# Patient Record
Sex: FEMALE | Race: BLACK OR AFRICAN AMERICAN | NOT HISPANIC OR LATINO | URBAN - METROPOLITAN AREA
[De-identification: names, ages, dates, MRNs, and addresses within clinical notes are randomized per-mention and may not be internally consistent; named-entity substitution may affect disease eponyms.]

---

## 2021-05-27 ENCOUNTER — EMERGENCY (EMERGENCY)
Facility: HOSPITAL | Age: 36
LOS: 0 days | Discharge: HOME | End: 2021-05-27
Attending: EMERGENCY MEDICINE | Admitting: EMERGENCY MEDICINE
Payer: COMMERCIAL

## 2021-05-27 VITALS
SYSTOLIC BLOOD PRESSURE: 128 MMHG | TEMPERATURE: 97 F | RESPIRATION RATE: 16 BRPM | HEART RATE: 63 BPM | OXYGEN SATURATION: 99 % | DIASTOLIC BLOOD PRESSURE: 82 MMHG | WEIGHT: 179.9 LBS

## 2021-05-27 VITALS
OXYGEN SATURATION: 100 % | SYSTOLIC BLOOD PRESSURE: 118 MMHG | HEART RATE: 60 BPM | DIASTOLIC BLOOD PRESSURE: 74 MMHG | RESPIRATION RATE: 18 BRPM

## 2021-05-27 DIAGNOSIS — M25.511 PAIN IN RIGHT SHOULDER: ICD-10-CM
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
## 2021-05-27 DIAGNOSIS — J45.909 UNSPECIFIED ASTHMA, UNCOMPLICATED: ICD-10-CM

## 2021-05-27 DIAGNOSIS — R00.1 BRADYCARDIA, UNSPECIFIED: ICD-10-CM

## 2021-05-27 DIAGNOSIS — S43.014A ANTERIOR DISLOCATION OF RIGHT HUMERUS, INITIAL ENCOUNTER: ICD-10-CM
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
## 2021-05-27 PROCEDURE — 99284 EMERGENCY DEPT VISIT MOD MDM: CPT | Mod: 57

## 2021-05-27 PROCEDURE — 23655 CLTX SHO DSLC W/MNPJ W/ANES: CPT | Mod: 54

## 2021-05-27 PROCEDURE — 93010 ELECTROCARDIOGRAM REPORT: CPT

## 2021-05-27 PROCEDURE — 73030 X-RAY EXAM OF SHOULDER: CPT | Mod: 26,RT

## 2021-05-27 RX ORDER — PROPOFOL 10 MG/ML
40 INJECTION, EMULSION INTRAVENOUS ONCE
Refills: 0 | Status: COMPLETED | OUTPATIENT
Start: 2021-05-27 | End: 2021-05-27

## 2021-05-27 RX ORDER — SODIUM CHLORIDE 9 MG/ML
1000 INJECTION, SOLUTION INTRAVENOUS ONCE
Refills: 0 | Status: COMPLETED | OUTPATIENT
Start: 2021-05-27 | End: 2021-05-27

## 2021-05-27 RX ORDER — ALBUTEROL 90 UG/1
2 AEROSOL, METERED ORAL
Qty: 0 | Refills: 0 | DISCHARGE

## 2021-05-27 RX ORDER — KETAMINE HYDROCHLORIDE 100 MG/ML
80 INJECTION INTRAMUSCULAR; INTRAVENOUS ONCE
Refills: 0 | Status: DISCONTINUED | OUTPATIENT
Start: 2021-05-27 | End: 2021-05-27

## 2021-05-27 RX ORDER — KETAMINE HYDROCHLORIDE 100 MG/ML
40 INJECTION INTRAMUSCULAR; INTRAVENOUS ONCE
Refills: 0 | Status: DISCONTINUED | OUTPATIENT
Start: 2021-05-27 | End: 2021-05-27

## 2021-05-27 RX ADMIN — KETAMINE HYDROCHLORIDE 40 MILLIGRAM(S): 100 INJECTION INTRAMUSCULAR; INTRAVENOUS at 09:22

## 2021-05-27 RX ADMIN — SODIUM CHLORIDE 1000 MILLILITER(S): 9 INJECTION, SOLUTION INTRAVENOUS at 09:18

## 2021-05-27 RX ADMIN — PROPOFOL 40 MILLIGRAM(S): 10 INJECTION, EMULSION INTRAVENOUS at 09:22

## 2021-05-27 NOTE — ED ADULT NURSE NOTE - OBJECTIVE STATEMENT
pt states she was carrying dry wall, felt right shoulder pop out of place. pt states this has happened before, denies any other injury.

## 2021-05-27 NOTE — ED ADULT NURSE NOTE - NS ED NURSE RECORD ANOTHER HT AND WT
Yes
I Ky Hunter MD saw and examined the patient. MLP saw and examined the patient under my supervision. I discussed the care of the patient with MLP and agree with MLP's plan, assessment and care of the patient while in the ED.

## 2021-05-27 NOTE — ED PROVIDER NOTE - ATTENDING CONTRIBUTION TO CARE
I personally evaluated patient. I agree with the findings and plan with all documentation on chart except as documented  in my note.    Patient presents with a atraumatic redislocation of her right shoulder. Patient n/v intact but has an obvious R anterior shoulder dislocation. No other pain or injuries. Patient reports her shoulder only goes back in with conscious sedation. Patient was consent, had conscious sedation and shoulder reduction without issue. Shoulder immobilizer placed. Repeat neuro exam is normal.    Shoulder dislocation home care and instructions discussed.    Full DC instructions discussed and patient knows when to seek immediate medical attention.  Patient has proper follow up.  All results discussed and patient aware they may require further work up.  Proper follow up ensured. Limitations of ED work up discussed.  Medications administered and prescribed/OTC home meds discussed.  All questions and concerns from patient or family addressed. Understanding of instructions verbalized.

## 2021-05-27 NOTE — ED ADULT TRIAGE NOTE - CHIEF COMPLAINT QUOTE
Pt was lifting drywall and felt her right shoulder pop out; pt states this has happened 3x before and has needed to be sedated. Pt is supposed to go for surgery.

## 2021-05-27 NOTE — ED PROVIDER NOTE - CARE PROVIDER_API CALL
Twila Vela)  Orthopaedic Surgery  [address] [place] [id number]  Phone: [phone]  Fax: [phone]  Follow Up Time:

## 2021-05-27 NOTE — ED PROVIDER NOTE - PROGRESS NOTE DETAILS
BK: Patient feeling much better s/p reduction and reports no pain. Full sensation in anterior shoulder and arm. Full ROM of fingers and wrist. Radial pulses 2+ b/l. Cap refill <2 seconds. Patient tolerating PO and ambulating without any issues. Agreeable for discharge now with ortho follow up for surgical fixation. PRICE: pt ambulating, tolerating po. return precautions given. pt will f/u this week with ortho.

## 2021-05-27 NOTE — ED PROVIDER NOTE - CLINICAL SUMMARY MEDICAL DECISION MAKING FREE TEXT BOX
Procedural sedation discharge instructions discussed and given.    Patient presents with a atraumatic redislocation of her right shoulder. Patient n/v intact but has an obvious R anterior shoulder dislocation. No other pain or injuries. Patient reports her shoulder only goes back in with conscious sedation. Patient was consent, had conscious sedation and shoulder reduction without issue. Shoulder immobilizer placed.    Shoulder dislocation home care and instructions discussed. Will DC with Orthopedics follow up.    Full DC instructions discussed and patient knows when to seek immediate medical attention.  Patient has proper follow up.  All results discussed and patient aware they may require further work up.  Proper follow up ensured. Limitations of ED work up discussed.  Medications administered and prescribed/OTC home meds discussed.  All questions and concerns from patient or family addressed. Understanding of instructions verbalized.

## 2021-05-27 NOTE — ED PROCEDURE NOTE - NS_POSTPROCCAREGUIDE_ED_ALL_ED
Patient is now fully awake, with vital signs and temperature stable, hydration is adequate, patients Souleymane’s  score is at baseline (or greater than 8), patient and escort has received  discharge education.

## 2021-05-27 NOTE — ED PROVIDER NOTE - PHYSICAL EXAMINATION
CONSTITUTIONAL: well-appearing, in NAD  SKIN: Warm dry, normal skin turgor  HEAD: NCAT  EYES: EOMI, PERRLA, no scleral icterus, conjunctiva pink  ENT: normal pharynx with no erythema or exudates  NECK: Supple; non tender. Full ROM.  CARD: RRR, no murmurs.  RESP: clear to ausculation b/l. No crackles or wheezing.  ABD: soft, non-tender, non-distended, no rebound or guarding.  EXT: right arm held in adduction, obvious shoulder deformity; radial pulses 2+ b/l, cap refill <2, full ROM of hand and wrist; no tenderness in hand, wrist, or elbow  NEURO: normal motor. normal sensory. Normal gait.  PSYCH: Cooperative, appropriate.

## 2021-05-27 NOTE — ED PROVIDER NOTE - NSFOLLOWUPINSTRUCTIONS_ED_ALL_ED_FT
Shoulder Dislocation  Your shoulder joint is made up of 3 bones:  The upper arm bone (humerus).The shoulder blade (scapula).The collarbone (clavicle).A shoulder dislocation happens when your upper arm bone moves out of its normal place in your shoulder joint.  What are the causes?  This condition is often caused by:  A fall.A hard, direct hit to the shoulder.A forceful movement of the shoulder.What increases the risk?  You are more likely to develop this condition if you play sports.  What are the signs or symptoms?     Bad shape (deformity) of the shoulder.Very bad pain.A shoulder that you cannot move.Numbness, weakness, or tingling in your neck or down your arm.Bruising or swelling around your shoulder.How is this treated?  This condition is treated with a procedure called a reduction. This is done to place the upper arm bone back in the joint. There are two types of reduction:  Closed reduction. The upper arm bone is placed back in the joint without surgery. The doctor uses his or her hands to guide the bone back into place.Open reduction. Surgery is done to place the upper arm bone back in the joint. This may be needed if:  You have a weak shoulder joint or weak tissues that connect bones to each other (ligaments).You have had more than one shoulder dislocation.The nerves or blood vessels around your shoulder have been damaged.After the procedure, you will wear a brace or sling to prevent the arm from moving.  After the brace or sling is removed, you will have physical therapy to help improve movement (range of motion) in your shoulder joint.  Follow these instructions at home:  Medicines     Take over-the-counter and prescription medicines only as told by your doctor.Ask your doctor if the medicine prescribed to you:   Requires you to avoid driving or using heavy machinery. Can cause trouble pooping (constipation). You may need to take steps to prevent or treat trouble pooping:  Drink enough fluid to keep your pee (urine) pale yellow.Take over-the-counter or prescription medicines.Eat foods that are high in fiber. These include beans, whole grains, and fresh fruits and vegetables. Limit foods that are high in fat and sugar. These include fried or sweet foods.If you have a brace or sling:     Wear the brace or sling as told by your doctor. Remove it only as told by your doctor.Loosen the brace or sling if your fingers:  Tingle.Become numb.Turn cold or blue.Keep the brace or sling clean.If the brace or sling is not waterproof:  Do not let it get wet.Cover it with a watertight covering when you take a bath or shower.Managing pain, stiffness, and swelling        If told, put ice on the injured area.  If you can remove your brace or sling, remove it as told by your doctor.Put ice in a plastic bag.Place a towel between your skin and the bag.Leave the ice on for 20 minutes, 2–3 times per day.Move your fingers often.Raise (elevate) the injured area above the level of your heart while you are sitting or lying down.Activity     Do not lift your arm above shoulder level until your doctor approves.Do not lift anything until your doctor says that it is safe.Do not push or pull things until your doctor approves.Return to your normal activities as told by your doctor. Ask your doctor what activities are safe for you.Do range-of-motion exercises only as told by your doctor.Exercise your hand by squeezing a soft ball. This keeps your hand and wrist from getting stiff and swollen.General instructions     Do not drive while you are wearing a brace or sling on a hand that you use for driving.Do not take baths, swim, or use a hot tub until your doctor approves. Ask your doctor if you may take showers. You may only be allowed to take sponge baths.Do not use any tobacco products, including cigarettes, chewing tobacco, or e-cigarettes. These can delay healing. If you need help quitting, ask your doctor.Keep all follow-up visits as told by your doctor. This is important.Contact a doctor if:  Your brace or sling gets damaged.Get help right away if:  Your pain gets worse, not better.You lose feeling in your arm or hand.Your arm or hand turns white and cold.Summary  A shoulder dislocation happens when your upper arm bone moves out of its normal place in your shoulder joint.It is often caused by a fall, a strong hit to the shoulder, or a forceful movement of the shoulder.It causes very bad pain. You may not be able to move your shoulder.This condition is treated with either closed or open reduction. You will also be given a brace or sling. You will do exercises to improve movement in your shoulder joint.Contact a doctor if your brace or sling gets damaged. Get help right away if your pain gets worse, you lose feeling in your arm or hand, or your arm or hand turns white or cold.This information is not intended to replace advice given to you by your health care provider. Make sure you discuss any questions you have with your health care provider. Shoulder Dislocation  Your shoulder joint is made up of 3 bones:  The upper arm bone (humerus).The shoulder blade (scapula).The collarbone (clavicle).A shoulder dislocation happens when your upper arm bone moves out of its normal place in your shoulder joint.  What are the causes?  This condition is often caused by:  A fall.A hard, direct hit to the shoulder.A forceful movement of the shoulder.What increases the risk?  You are more likely to develop this condition if you play sports.  What are the signs or symptoms?     Bad shape (deformity) of the shoulder.Very bad pain.A shoulder that you cannot move.Numbness, weakness, or tingling in your neck or down your arm.Bruising or swelling around your shoulder.How is this treated?  This condition is treated with a procedure called a reduction. This is done to place the upper arm bone back in the joint. There are two types of reduction:  Closed reduction. The upper arm bone is placed back in the joint without surgery. The doctor uses his or her hands to guide the bone back into place.Open reduction. Surgery is done to place the upper arm bone back in the joint. This may be needed if:  You have a weak shoulder joint or weak tissues that connect bones to each other (ligaments).You have had more than one shoulder dislocation.The nerves or blood vessels around your shoulder have been damaged.After the procedure, you will wear a brace or sling to prevent the arm from moving.  After the brace or sling is removed, you will have physical therapy to help improve movement (range of motion) in your shoulder joint.  Follow these instructions at home:  Medicines     Take over-the-counter and prescription medicines only as told by your doctor.Ask your doctor if the medicine prescribed to you:   Requires you to avoid driving or using heavy machinery. Can cause trouble pooping (constipation). You may need to take steps to prevent or treat trouble pooping:  Drink enough fluid to keep your pee (urine) pale yellow.Take over-the-counter or prescription medicines.Eat foods that are high in fiber. These include beans, whole grains, and fresh fruits and vegetables. Limit foods that are high in fat and sugar. These include fried or sweet foods.If you have a brace or sling:     Wear the brace or sling as told by your doctor. Remove it only as told by your doctor.Loosen the brace or sling if your fingers:  Tingle.Become numb.Turn cold or blue.Keep the brace or sling clean.If the brace or sling is not waterproof:  Do not let it get wet.Cover it with a watertight covering when you take a bath or shower.Managing pain, stiffness, and swelling        If told, put ice on the injured area.  If you can remove your brace or sling, remove it as told by your doctor.Put ice in a plastic bag.Place a towel between your skin and the bag.Leave the ice on for 20 minutes, 2–3 times per day.Move your fingers often.Raise (elevate) the injured area above the level of your heart while you are sitting or lying down.Activity     Do not lift your arm above shoulder level until your doctor approves.Do not lift anything until your doctor says that it is safe.Do not push or pull things until your doctor approves.Return to your normal activities as told by your doctor. Ask your doctor what activities are safe for you.Do range-of-motion exercises only as told by your doctor.Exercise your hand by squeezing a soft ball. This keeps your hand and wrist from getting stiff and swollen.General instructions     Do not drive while you are wearing a brace or sling on a hand that you use for driving.Do not take baths, swim, or use a hot tub until your doctor approves. Ask your doctor if you may take showers. You may only be allowed to take sponge baths.Do not use any tobacco products, including cigarettes, chewing tobacco, or e-cigarettes. These can delay healing. If you need help quitting, ask your doctor.Keep all follow-up visits as told by your doctor. This is important.Contact a doctor if:  Your brace or sling gets damaged.Get help right away if:  Your pain gets worse, not better.You lose feeling in your arm or hand.Your arm or hand turns white and cold.Summary  A shoulder dislocation happens when your upper arm bone moves out of its normal place in your shoulder joint.It is often caused by a fall, a strong hit to the shoulder, or a forceful movement of the shoulder.It causes very bad pain. You may not be able to move your shoulder.This condition is treated with either closed or open reduction. You will also be given a brace or sling. You will do exercises to improve movement in your shoulder joint.Contact a doctor if your brace or sling gets damaged. Get help right away if your pain gets worse, you lose feeling in your arm or hand, or your arm or hand turns white or cold.This information is not intended to replace advice given to you by your health care provider. Make sure you discuss any questions you have with your health care provider.    Dane Shoulder Dislocation  Your shoulder joint is made up of 3 bones:  The upper arm bone (humerus).The shoulder blade (scapula).The collarbone (clavicle).A shoulder dislocation happens when your upper arm bone moves out of its normal place in your shoulder joint.  What are the causes?  This condition is often caused by:  A fall.A hard, direct hit to the shoulder.A forceful movement of the shoulder.What increases the risk?  You are more likely to develop this condition if you play sports.  What are the signs or symptoms?     Bad shape (deformity) of the shoulder.Very bad pain.A shoulder that you cannot move.Numbness, weakness, or tingling in your neck or down your arm.Bruising or swelling around your shoulder.How is this treated?  This condition is treated with a procedure called a reduction. This is done to place the upper arm bone back in the joint. There are two types of reduction:  Closed reduction. The upper arm bone is placed back in the joint without surgery. The doctor uses his or her hands to guide the bone back into place.Open reduction. Surgery is done to place the upper arm bone back in the joint. This may be needed if:  You have a weak shoulder joint or weak tissues that connect bones to each other (ligaments).You have had more than one shoulder dislocation.The nerves or blood vessels around your shoulder have been damaged.After the procedure, you will wear a brace or sling to prevent the arm from moving.  After the brace or sling is removed, you will have physical therapy to help improve movement (range of motion) in your shoulder joint.  Follow these instructions at home:  Medicines     Take over-the-counter and prescription medicines only as told by your doctor.Ask your doctor if the medicine prescribed to you:   Requires you to avoid driving or using heavy machinery. Can cause trouble pooping (constipation). You may need to take steps to prevent or treat trouble pooping:  Drink enough fluid to keep your pee (urine) pale yellow.Take over-the-counter or prescription medicines.Eat foods that are high in fiber. These include beans, whole grains, and fresh fruits and vegetables. Limit foods that are high in fat and sugar. These include fried or sweet foods.If you have a brace or sling:     Wear the brace or sling as told by your doctor. Remove it only as told by your doctor.Loosen the brace or sling if your fingers:  Tingle.Become numb.Turn cold or blue.Keep the brace or sling clean.If the brace or sling is not waterproof:  Do not let it get wet.Cover it with a watertight covering when you take a bath or shower.Managing pain, stiffness, and swelling        If told, put ice on the injured area.  If you can remove your brace or sling, remove it as told by your doctor.Put ice in a plastic bag.Place a towel between your skin and the bag.Leave the ice on for 20 minutes, 2–3 times per day.Move your fingers often.Raise (elevate) the injured area above the level of your heart while you are sitting or lying down.Activity     Do not lift your arm above shoulder level until your doctor approves.Do not lift anything until your doctor says that it is safe.Do not push or pull things until your doctor approves.Return to your normal activities as told by your doctor. Ask your doctor what activities are safe for you.Do range-of-motion exercises only as told by your doctor.Exercise your hand by squeezing a soft ball. This keeps your hand and wrist from getting stiff and swollen.General instructions     Do not drive while you are wearing a brace or sling on a hand that you use for driving.Do not take baths, swim, or use a hot tub until your doctor approves. Ask your doctor if you may take showers. You may only be allowed to take sponge baths.Do not use any tobacco products, including cigarettes, chewing tobacco, or e-cigarettes. These can delay healing. If you need help quitting, ask your doctor.Keep all follow-up visits as told by your doctor. This is important.Contact a doctor if:  Your brace or sling gets damaged.Get help right away if:  Your pain gets worse, not better.You lose feeling in your arm or hand.Your arm or hand turns white and cold.Summary  A shoulder dislocation happens when your upper arm bone moves out of its normal place in your shoulder joint.It is often caused by a fall, a strong hit to the shoulder, or a forceful movement of the shoulder.It causes very bad pain. You may not be able to move your shoulder.This condition is treated with either closed or open reduction. You will also be given a brace or sling. You will do exercises to improve movement in your shoulder joint.Contact a doctor if your brace or sling gets damaged. Get help right away if your pain gets worse, you lose feeling in your arm or hand, or your arm or hand turns white or cold.This information is not intended to replace advice given to you by your health care provider. Make sure you discuss any questions you have with your health care provider.    Procedural Sedation    During your Emergency Department visit, you were given medication to help relax you and alleviate pain to aid in a diagnostic or therapeutic procedure. The medication given is typically short-acting but may have some lingering effects for up to 48 hours. Do not be alone - have a responsible adult stay with you until you are awake and alert. Do not drive or operate heavy machinery for the next 24 hours. Do not drink alcohol or smoke or take medicines that cause drowsiness. Do not make important decisions or sign legal documents or take care of children on your own.    SEEK IMMEDIATE MEDICAL CARE IF YOU HAVE ANY OF THE FOLLOWING SYMPTOMS: trouble breathing, confusion, inability to urinate, severe pain, rash, lightheadedness/dizziness, or fainting.

## 2021-05-27 NOTE — ED PROVIDER NOTE - OBJECTIVE STATEMENT
36 yo F with PMH of asthma and multiple shoulder dislocations presenting with R shoulder pain. 1 hour PTA patient was lifting dry wall, when she felt pain in her right shoulder similar to prior dislocations. Patient denies any other injury including head trauma, LOC, or pain anywhere else. Denies headache, numbness, weakness, tingling, back pain, neck pain, chest pain, shortness of breath, nausea, vomiting, diarrhea. Most recent shoulder dislocation was July and required procedural sedation.

## 2021-05-27 NOTE — ED PROVIDER NOTE - NS ED ROS FT
Constitutional:  (-) fever, (-) chills, (-) lethargy  Eyes:  (-) eye pain (-) visual changes  ENMT: (-) nasal discharge, (-) sore throat. (-) neck pain or stiffness  Cardiac: (-) chest pain (-) palpitations  Respiratory:  (-) cough (-) respiratory distress.   GI:  (-) nausea (-) vomiting (-) diarrhea (-) abdominal pain.  :  (-) dysuria (-) frequency (-) burning.  MS:  (-) back pain (+) joint pain, R shoulder  Neuro:  (-) headache (-) numbness (-) tingling (-) focal weakness  Skin:  (-) rash  Except as documented in the HPI,  all other systems are negative

## 2021-05-27 NOTE — ED PROVIDER NOTE - PATIENT PORTAL LINK FT
You can access the FollowMyHealth Patient Portal offered by [facility] by registering at the following website: [email or web address]. By joining [employer]’s FollowMyHealth portal, you will also be able to view your health information using other applications (apps) compatible with our system.

## 2021-11-04 ENCOUNTER — EMERGENCY (EMERGENCY)
Facility: HOSPITAL | Age: 36
LOS: 0 days | Discharge: HOME | End: 2021-11-04
Attending: EMERGENCY MEDICINE | Admitting: EMERGENCY MEDICINE
Payer: COMMERCIAL

## 2021-11-04 VITALS
OXYGEN SATURATION: 99 % | HEART RATE: 78 BPM | TEMPERATURE: 98 F | WEIGHT: 182.98 LBS | SYSTOLIC BLOOD PRESSURE: 131 MMHG | DIASTOLIC BLOOD PRESSURE: 82 MMHG | RESPIRATION RATE: 18 BRPM

## 2021-11-04 VITALS
RESPIRATION RATE: 20 BRPM | TEMPERATURE: 98 F | HEART RATE: 56 BPM | SYSTOLIC BLOOD PRESSURE: 103 MMHG | OXYGEN SATURATION: 100 % | DIASTOLIC BLOOD PRESSURE: 66 MMHG

## 2021-11-04 DIAGNOSIS — J45.909 UNSPECIFIED ASTHMA, UNCOMPLICATED: ICD-10-CM

## 2021-11-04 DIAGNOSIS — S43.004A UNSPECIFIED DISLOCATION OF RIGHT SHOULDER JOINT, INITIAL ENCOUNTER: ICD-10-CM

## 2021-11-04 DIAGNOSIS — S43.014A ANTERIOR DISLOCATION OF RIGHT HUMERUS, INITIAL ENCOUNTER: ICD-10-CM
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
## 2021-11-04 PROCEDURE — 73030 X-RAY EXAM OF SHOULDER: CPT | Mod: 26,RT

## 2021-11-04 PROCEDURE — 99284 EMERGENCY DEPT VISIT MOD MDM: CPT | Mod: 57

## 2021-11-04 PROCEDURE — 93010 ELECTROCARDIOGRAM REPORT: CPT

## 2021-11-04 PROCEDURE — 23655 CLTX SHO DSLC W/MNPJ W/ANES: CPT | Mod: 54

## 2021-11-04 RX ORDER — IBUPROFEN 200 MG
1 TABLET ORAL
Qty: 15 | Refills: 0
Start: 2021-11-04 | End: 2021-11-08

## 2021-11-04 RX ORDER — FENTANYL CITRATE 50 UG/ML
125 INJECTION INTRAVENOUS ONCE
Refills: 0 | Status: DISCONTINUED | OUTPATIENT
Start: 2021-11-04 | End: 2021-11-04

## 2021-11-04 RX ORDER — PROPOFOL 10 MG/ML
80 INJECTION, EMULSION INTRAVENOUS ONCE
Refills: 0 | Status: COMPLETED | OUTPATIENT
Start: 2021-11-04 | End: 2021-11-04

## 2021-11-04 RX ORDER — ACETAMINOPHEN 500 MG
2 TABLET ORAL
Qty: 30 | Refills: 0
Start: 2021-11-04 | End: 2021-11-08

## 2021-11-04 RX ADMIN — PROPOFOL 80 MILLIGRAM(S): 10 INJECTION, EMULSION INTRAVENOUS at 17:04

## 2021-11-04 RX ADMIN — FENTANYL CITRATE 125 MICROGRAM(S): 50 INJECTION INTRAVENOUS at 17:03

## 2021-11-04 NOTE — ED PROVIDER NOTE - CLINICAL SUMMARY MEDICAL DECISION MAKING FREE TEXT BOX
37 yo F with anrerior shoulder dislcoation, had closed reduction with proceduralsedation and tolerated both well. nvintact pre and post.

## 2021-11-04 NOTE — ED ADULT TRIAGE NOTE - CHIEF COMPLAINT QUOTE
As per patient she had meconial trip and fall landing on left shoulder , and states she thinks its dislocated this has happened in the past but this time she is not able to pop back in

## 2021-11-04 NOTE — ED PROVIDER NOTE - CARE PROVIDER_API CALL
Manoj Blackmon)  Orthopaedic Surgery  [address] [place] [address]  Phone: [phone]  Fax: [phone]  Follow Up Time:

## 2021-11-04 NOTE — ED PROVIDER NOTE - PATIENT PORTAL LINK FT
You can access the FollowMyHealth Patient Portal offered by [facility] by registering at the following website: [email or web address]. By joining [employer]’s FollowMyHealth portal, you will also be able to view your health information using other applications (apps) compatible with our system.

## 2021-11-04 NOTE — ED PROVIDER NOTE - PHYSICAL EXAMINATION
Gen: Alert, NAD  Skin: Warm, dry, intact  HEENT: NCAT  Neck: FROM, no midline TTP, sensation intact and strength 5/5 in radial, ulnar and median distribtions bilaterally.   CV: RRR, normal S1, S2, no m/r/g, no peripheral edema  Chest: no deformity, no TTP, axillae clear b/l  Resp: Airway intact, Non labored respirations, Lungs CTA b/l, no wheezes, rales, rhonchi  Abdomen: Soft, nondistended, nontender  Back: midline, nontender, no stepoffs  R shoulder sqaured off with anterior prominence. cannot range, skin intact. Motor function intact in distributions of the radial (5/5 strength in extension of the 1st digit), ulnar (finger thumb abduction), and median nerves (5/5 opposition 1st-2nd fingers). Sensation intact in radial (dorsal web between 1st and 2nd digits), median (tip of the 2nd digit), and ulnar (tip of the 5th digit). Radial and ulnar pulse 2+; silt over lateral deltoid.  Extremities otherwise: ESCALANTE, no TTP or deformities  Neuro: No focal neuro deficits, GCS 15  Psych: Cooperative

## 2021-11-04 NOTE — ED PROCEDURE NOTE - NS ED PERI VASCULAR NEG
Motor function intact in distributions of the radial (5/5 strength in extension of the 1st digit), ulnar (finger thumb abduction), and median nerves (5/5 opposition 1st-2nd fingers). Sensation intact in radial (dorsal web between 1st and 2nd digits), median (tip of the 2nd digit), and ulnar (tip of the 5th digit). Radial and ulnar pulse 2+ and silt over lateral deltoid pre and post

## 2021-11-04 NOTE — ED PROVIDER NOTE - NSFOLLOWUPINSTRUCTIONS_ED_ALL_ED_FT
Procedural Sedation    During your Emergency Department visit, you were given medication to help relax you and alleviate pain to aid in a diagnostic or therapeutic procedure. The medication given is typically short-acting but may have some lingering effects for up to 48 hours. Do not be alone - have a responsible adult stay with you until you are awake and alert. Do not drive or operate heavy machinery for the next 24 hours. Do not drink alcohol or smoke or take medicines that cause drowsiness. Do not make important decisions or sign legal documents or take care of children on your own.    SEEK IMMEDIATE MEDICAL CARE IF YOU HAVE ANY OF THE FOLLOWING SYMPTOMS: trouble breathing, confusion, inability to urinate, severe pain, rash, lightheadedness/dizziness, or fainting. Procedural Sedation discharge instructions.     During your Emergency Department visit, you were given medication to help relax you and alleviate pain to aid in a diagnostic or therapeutic procedure. The medication given is typically short-acting but may have some lingering effects for up to 48 hours. Do not be alone - have a responsible adult stay with you until you are awake and alert. Do not drive or operate heavy machinery for the next 24 hours. Do not drink alcohol or smoke or take medicines that cause drowsiness. Do not make important decisions or sign legal documents or take care of children on your own.    SEEK IMMEDIATE MEDICAL CARE IF YOU HAVE ANY OF THE FOLLOWING SYMPTOMS: trouble breathing, confusion, inability to urinate, severe pain, rash, lightheadedness/dizziness, or fainting.    Please return to the emergency department if you have discoloration to your arm, hand or fingers, worsening swelling, weakness, numbness, tingling, severe pain, fevers >100.4 or other symptoms that you find concerning. You can leave the sling on for comfort but do not leave on 24/7 otherwise your shoulder will get stiff. You can do gentle range of motion exercises but no heavy lifting. You can take ibuprofen 600mg every 6-8 hours as needed. This is an over-the-counter medication you may purchase without a prescription. You can take acetaminophen (Tylenol) 650mg every 6 hours as needed. This is an over-the-counter medication you may purchase without a prescription.

## 2021-11-04 NOTE — ED PROVIDER NOTE - NSFOLLOWUPCLINICS_GEN_ALL_ED_FT
[facility]  Orthpedic  [address], [place]   Phone: [phone]  Fax:   Follow Up Time: 4-6 Days

## 2021-11-04 NOTE — ED PROVIDER NOTE - OBJECTIVE STATEMENT
36yoF with hx of asthma, recurrent R shoudler dislocations here with R shoulder pain and deformity after fell on shoulder tripping on carpet at home. pain in R shoulder only. constant, R shoulder, worse with movement, sharp, nonradiating. denies any other injuries. no cp or sob.

## 2022-07-07 NOTE — ED PROCEDURE NOTE - CPROC ED INDICATIONS1
.      CERTIFICATE OF RETURN TO WORK    July 7, 2022      Re:   Ian Delvalle  [address] [place] [address]                        This is to certify that Ian Delvalle has been under my care on 7/7/2022.        SIGNATURE:___________________________________________,   7/7/2022      Krista Schroeder MD           [facility]  Urgent Care Clinic  [address] [place], [place] [id number] [phone]  Ext. [phone]  
R anterior shoulder dislocation

## 2022-08-02 NOTE — ED PROCEDURE NOTE - CPROC ED ASA PS STATUS1
ASA PS 2: Mild systemic disease If you are a smoker, it is important for your health to stop smoking. Please be aware that second hand smoke is also harmful.

## 2023-12-25 ENCOUNTER — EMERGENCY (EMERGENCY)
Facility: HOSPITAL | Age: 38
LOS: 0 days | Discharge: HOME | End: 2023-12-26
Attending: EMERGENCY MEDICINE
Payer: COMMERCIAL

## 2023-12-25 VITALS
TEMPERATURE: 98 F | SYSTOLIC BLOOD PRESSURE: 108 MMHG | WEIGHT: 194.89 LBS | DIASTOLIC BLOOD PRESSURE: 63 MMHG | HEIGHT: 66 IN | OXYGEN SATURATION: 100 % | RESPIRATION RATE: 16 BRPM | HEART RATE: 66 BPM

## 2023-12-25 DIAGNOSIS — S91.011A LACERATION WITHOUT FOREIGN BODY, RIGHT ANKLE, INITIAL ENCOUNTER: ICD-10-CM
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
## 2023-12-25 DIAGNOSIS — J45.909 UNSPECIFIED ASTHMA, UNCOMPLICATED: ICD-10-CM
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
## 2023-12-25 PROCEDURE — 99284 EMERGENCY DEPT VISIT MOD MDM: CPT

## 2023-12-25 PROCEDURE — 99283 EMERGENCY DEPT VISIT LOW MDM: CPT

## 2023-12-25 NOTE — ED ADULT TRIAGE NOTE - CHIEF COMPLAINT QUOTE
Pt. complains of right ankle pain. Pt. reports cutting herself while shaving a few days ago, however " cut is not healing"

## 2023-12-26 RX ORDER — BACITRACIN ZINC 500 UNIT/G
1 OINTMENT IN PACKET (EA) TOPICAL
Qty: 1 | Refills: 0
Start: 2023-12-26 | End: 2024-01-01

## 2023-12-26 NOTE — ED PROVIDER NOTE - CLINICAL SUMMARY MEDICAL DECISION MAKING FREE TEXT BOX
38-year-old female with past medical history of asthma presents to the ED for evaluation of a right ankle injury sustained from cutting herself while shaving last week.  States she cut herself to the lateral aspect of the right foot below the ankle joint, and to the posterior foot/ankle space (below achilles tendon) with no difficulty in ambulation or range of motion.  Patient states that the lateral cut just below the ankle joint has healed but with surrounding soft tissue swelling, and the second laceration behind the foot/posterior ankle area remains open as she is constantly wearing boots and walking and the area rubs against her shoe and sock.  States this open cut to the back of her foot/ankle had some drainage recently, now no longer does.  Denies any red lines going up the leg or down the foot.  Denies any fever/chills/shortness of breath/chest pain/calf tenderness/pedal edema/palpitations/dizziness/trouble ambulating.    Agree with PA exam management.  Bedside sono done to check for any collections of fluid, and none noted.     A/P Recommend wound care with topical ointment and oral antibiotics, and a padded bandage dressing for the posterior ankle/foot cut as it keeps rubbing her socks and shoes when she walks.  Recommend follow-up with her PMD for reassessment or can return to the ER for wound evaluation.  Return to the ER for any increased pain/swelling/red lines going up the leg or down the foot, or any other concerns she may have

## 2023-12-26 NOTE — ED PROVIDER NOTE - PHYSICAL EXAMINATION
Physical Exam    Vital Signs: I have reviewed the initial vital signs.  Constitutional: appears stated age, no acute distress  Eyes: Sclera clear, EOMI.  Cardiovascular: well-perfused extremities, pedal pulses 2+ and equal  Respiratory: unlabored respiratory effort  Musculoskeletal: full range of motion right ankle  Integumentary: warm, dry, partially healed 1.5 cm laceration to posterior aspect of right ankle and healed laceration to right lateral malleolus without overlying fluctuance or surrounding erythema  Neurologic: awake, alert, oriented x3, extremities’ motor and sensory functions grossly intact Physical Exam    Vital Signs: I have reviewed the initial vital signs.  Constitutional: appears stated age, no acute distress  Eyes: Sclera clear, EOMI.  Cardiovascular: well-perfused extremities, pedal pulses 2+ and equal  Respiratory: unlabored respiratory effort  Musculoskeletal: full range of motion right ankle, laceration does not go thru achilles tendon --has no motor weakness with exam  Integumentary: warm, dry, partially healed 1.5 cm laceration to posterior aspect of right ankle and healed laceration to right lateral malleolus without overlying fluctuance or surrounding erythema. mild soft tissue swelling around lateral ankle  Neurologic: awake, alert, oriented x3, extremities’ motor and sensory functions grossly intact

## 2023-12-26 NOTE — ED PROVIDER NOTE - PATIENT PORTAL LINK FT
You can access the FollowMyHealth Patient Portal offered by [facility] by registering at the following website: [email or web address]. By joining [facility]’s FollowMyHealth portal, you will also be able to view your health information using other applications (apps) compatible with our system. You can access the FollowMyHealth Patient Portal offered by [facility] by registering at the following website: [email or web address]. By joining [facility]’s FollowMyHealth portal, you will also be able to view your health information using other applications (apps) compatible with our system.

## 2023-12-26 NOTE — ED PROVIDER NOTE - ATTENDING APP SHARED VISIT CONTRIBUTION OF CARE
38-year-old female with past medical history of asthma presents to the ED for evaluation of a right ankle injury sustained from cutting herself while shaving last week.  States she cut herself to the lateral aspect of the right foot below the ankle joint, and to the posterior foot/ankle space (below achilles tendon) with no difficulty in ambulation or range of motion.  Patient states that the lateral cut just below the ankle joint has healed but with surrounding soft tissue swelling, and the second laceration behind the foot/posterior ankle area remains open as she is constantly wearing boots and walking and the area rubs against her shoe and sock.  States this open cut to the back of her foot/ankle had some drainage recently, now no longer does.  Denies any red lines going up the leg or down the foot.  Denies any fever/chills/shortness of breath/chest pain/calf tenderness/pedal edema/palpitations/dizziness/trouble ambulating.    Agree with PA exam management.  Bedside sono done to check for any collections of fluid, and none noted.     A/P Recommend wound care with topical ointment and oral antibiotics, and a padded bandage dressing for the posterior ankle/foot cut as it keeps rubbing her socks and shoes when she walks.  Recommend follow-up with her PMD for reassessment or can return to the ER for wound evaluation.  Return to the ER for any increased pain/swelling/red lines going up the leg or down the foot, or any other concerns she may have     ALL: nkda  Meds albuterol, symbicort, nexplanon  SH: denies smoking or etoh  PMD denies  LMP First week of December, regular periods  Pharmacy [facility] 38-year-old female with past medical history of asthma presents to the ED for evaluation of a right ankle injury sustained from cutting herself while shaving last week.  States she cut herself to the lateral aspect of the right foot below the ankle joint, and to the posterior foot/ankle space (below achilles tendon) with no difficulty in ambulation or range of motion.  Patient states that the lateral cut just below the ankle joint has healed but with surrounding soft tissue swelling, and the second laceration behind the foot/posterior ankle area remains open as she is constantly wearing boots and walking and the area rubs against her shoe and sock.  States this open cut to the back of her foot/ankle had some drainage recently, now no longer does.  Denies any red lines going up the leg or down the foot.  Denies any fever/chills/shortness of breath/chest pain/calf tenderness/pedal edema/palpitations/dizziness/trouble ambulating.    Agree with PA exam management.  Bedside sono done to check for any collections of fluid, and none noted.     A/P Recommend wound care with topical ointment and oral antibiotics, and a padded bandage dressing for the posterior ankle/foot cut as it keeps rubbing her socks and shoes when she walks.  Recommend follow-up with her PMD for reassessment or can return to the ER for wound evaluation.  Return to the ER for any increased pain/swelling/red lines going up the leg or down the foot, or any other concerns she may have     ALL: nkda  Meds albuterol, symbicort, nexplanon  SH: denies smoking or etoh  PMD denies  LMP First week of December, regular periods  Pharmacy [facility]

## 2023-12-26 NOTE — ED PROVIDER NOTE - NSFOLLOWUPINSTRUCTIONS_ED_ALL_ED_FT
Antibiotics were sent to your pharmacy - take as prescribed.    Laceration    A laceration is a cut that goes through all of the layers of the skin and into the tissue that is right under the skin. Some lacerations heal on their own. Others need to be closed with stitches (sutures), staples, skin adhesive strips, or skin glue. Proper laceration care minimizes the risk of infection and helps the laceration to heal better.     SEEK IMMEDIATE MEDICAL CARE IF YOU HAVE THE FOLLOWING SYMPTOMS: swelling around the wound, worsening pain, drainage from the wound, red streaking going away from your wound, inability to move finger or toe near the laceration, or discoloration of skin near the laceration.

## 2023-12-26 NOTE — ED PROVIDER NOTE - OBJECTIVE STATEMENT
38-year-old female with past medical history asthma on albuterol, on Nexplanon for birth control presents with complaint of right ankle wound.  Patient reports 1 week ago she was in the shower shaving her right ankle when she sustained a laceration to the right posterior ankle and over the right lateral malleolus.  Reports she noted subsequent swelling to the area and yesterday squeezed a small amount of purulence discharge from the wound at the posterior aspect of the right ankle.  Denies fever/chills, lightheadedness, dizziness, weakness, decreased appetite, change in bowel/bladder habits, decreased in range of motion of right ankle, tracking erythema, difficulty with weightbearing, gait disturbance.

## 2025-06-11 NOTE — ED ADULT TRIAGE NOTE - DOMESTIC TRAVEL HIGH RISK QUESTION
Continue Flonase 2 squirts in each nostril daily. May take OTC Claritin or Zyrtec to help dry up fluid. Follow up with PCP in 10-14 days if symptoms are not improving, sooner if symptoms worsen.    No